# Patient Record
Sex: FEMALE | Employment: FULL TIME
[De-identification: names, ages, dates, MRNs, and addresses within clinical notes are randomized per-mention and may not be internally consistent; named-entity substitution may affect disease eponyms.]

---

## 2023-09-28 ENCOUNTER — HOSPITAL ENCOUNTER (OUTPATIENT)
Facility: HOSPITAL | Age: 53
Setting detail: RECURRING SERIES
End: 2023-09-28
Payer: COMMERCIAL

## 2023-09-28 PROCEDURE — 97162 PT EVAL MOD COMPLEX 30 MIN: CPT

## 2023-09-28 PROCEDURE — 97535 SELF CARE MNGMENT TRAINING: CPT

## 2023-09-28 NOTE — PROGRESS NOTES
[facility]  [address] [facility], [address] [facility]  Ph [phone]  Fx [phone]    Plan of Care/ Statement of Necessity for Physical Therapy Services    Patient name: Anita Flor Start of Care: 2023   Referral source: Marta Stroud DO : 1970    Medical Diagnosis: Other symptoms and signs involving the genitourinary system [R39.89]   Onset Date:3/28/2023    Treatment Diagnosis: Other symptoms and signs involving the genitourinary system [R39.89]   Prior Hospitalization: see medical history Provider#: [id number]   Medications: Verified on Patient summary List    Comorbidities:  1 pregnancy/ vaginal with tearing (3 stitches), deviated septum   Prior Level of Function: active lifestyle, no UI          The Plan of Care and following information is based on the information from the initial evaluation. Assessment/ key information: Patient is a 47year old female presenting to Physical Therapy with c/o stress urinary incontinence, urinary frequency,  and cystocele which is limiting ability function at previous level. Patient has no pain complaints with palpation of the pelvic floor muscles. Patient presents with decreased strength, coordination, and endurance of the pelvic floor muscles and decreased strength of postural stabilizers. Patient presents with limited understanding of bladder and bowel anatomy/function, dietary irritants, and urge suppression. I feel patient would benefit from skilled therapeutic intervention to optimize highest functional level possible. Evaluation Complexity HistoryMEDIUM  Complexity : 1-2 comorbidities / personal factors will impact the outcome/ POC  ; Examination MEDIUM Complexity : 3 Standardized tests and measures addressin body structure, function, activity limitation and / or participation in recreation  ;Presentation MEDIUM Complexity : Evolving with changing characteristics  ; Clinical Decision Making MEDIUM Complexity : FOTO

## 2023-09-28 NOTE — PROGRESS NOTES
Physical Therapy Evaluation      Patient Name: Salome Rodriguez    Date: 2023    : 1970  Insurance: Payor: Heidi Mancuso / Plan: Heidi Mancuso / Product Type: *No Product type* /      Patient  verified yes     Visit #   Current / Total 1 12   Time   In / Out 1110 1155   Pain   In / Out 0 0   Subjective Functional Status/Changes: eval   Changes to:  Meds, Allergies, Med Hx, Sx Hx?   If yes, update Summary List no       TREATMENT AREA =  Other symptoms and signs involving the genitourinary system [R39.89]    SUBJECTIVE  Pain Level (0-10 scale): 0  []constant []intermittent []improving []worsening []no change since onset    Any medication changes, allergies to medications, adverse drug reactions, diagnosis change, or new procedure performed?: [x] No    [] Yes (see summary sheet for update)  Subjective functional status/changes:     PLOF: active lifestyle, no UI  Limitations to PLOF: leaks with cough/laugh/sneeze and with lifting,   Mechanism of Injury: insidious - 3/28/2023  Current symptoms/Complaints: leaks with cough/laugh/sneeze and with lifting, cystocele  Previous Treatment/Compliance: none  PMHx/Surgical Hx: 1 pregnancy/ vaginal with tearing (3 stitches), deviated septum  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   Pt Goals: stop leaking  Barriers: []pain []financial []time []transportation []other  Motivation: very  Substance use: []Alcohol []Tobacco []other:   Cognition: A & O x 3    Other:    Current urinary complaint  leaks with activity (stress induced)  urinary frequency  sensation of incomplete bladder emptying intermittently  stress incontinence  dysfunctional voiding    Bladder complaint longevity:  6 months to 1 year    Bladder symptom progression:  the same    Frequency of UI: 3 times per day    Pad use:  none, does not require    Pad wetness when changed:  drop or two    Daytime urinary frequency:  Every 1 hour(s) during the day  - to prevent leakage    Nocturia:  0x/ none   coccyx none             Pelvic floor MMT    PERF (Performance/Endurance/Repetitions//Flicks): 4/5/9//0 abdominal  substitution noted    Pelvic muscle release pattern  2 - partial, delayed release      30 min []Eval - untimed                           Therapeutic Procedures: Tx Min Billable or 1:1 Min (if diff from Tx Min) Procedure, Rationale, Specifics   15  91943 Self Care/Home Management (timed):  improve patient knowledge and understanding of activity modification  to improve patient's ability to progress to PLOF and address remaining functional goals. (see flow sheet as applicable)     Details if applicable:  Bladder fitness, KPFE, urge suppression, post void dribble          Details if applicable:            Details if applicable:            Details if applicable:            Details if applicable:     15 13 [facility] Totals Reminder: bill using total billable min of TIMED therapeutic procedures (example: do not include dry needle or estim unattended, both untimed codes, in totals to left)  8-22 min = 1 unit; 23-37 min = 2 units; 38-52 min = 3 units; 53-67 min = 4 units; 68-82 min = 5 units   Total Total     [x]  Patient Education billed concurrently with other procedures   [x] Review HEP    [] Progressed/Changed HEP, detail:    [] Other detail:        Pain Level (0-10 scale) post treatment: 0    ASSESSMENT/Changes in Function: Patient is a 47year old female presenting to Physical Therapy with c/o stress urinary incontinence, urinary frequency,  and cystocele which is limiting ability function at previous level. Patient has no pain complaints with palpation of the pelvic floor muscles. Patient presents with decreased strength, coordination, and endurance of the pelvic floor muscles and decreased strength of postural stabilizers. Patient presents with limited understanding of bladder and bowel anatomy/function, dietary irritants, and urge suppression.  I feel patient would benefit from skilled therapeutic

## 2023-10-05 ENCOUNTER — HOSPITAL ENCOUNTER (OUTPATIENT)
Facility: HOSPITAL | Age: 53
Setting detail: RECURRING SERIES
Discharge: HOME | End: 2023-10-08
Payer: COMMERCIAL

## 2023-10-05 PROCEDURE — 97112 NEUROMUSCULAR REEDUCATION: CPT

## 2023-10-05 PROCEDURE — 97530 THERAPEUTIC ACTIVITIES: CPT

## 2023-10-05 PROCEDURE — 97110 THERAPEUTIC EXERCISES: CPT

## 2023-10-05 NOTE — PROGRESS NOTES
AM Chris Thomas [facility]   11/15/2023 10:30 AM Macario Carrizales, [facility]   11/22/2023 10:30 AM Chris Thomas, [facility]   11/29/2023 10:30 AM Chris Thomas, [facility]   12/6/2023 10:30 AM Chris Thomas [facility]   12/13/2023 10:30 AM Macario Carrizales [place] [facility] [place] [facility]   12/20/2023 10:30 AM Macario Carrizales, [facility]

## 2023-10-10 ENCOUNTER — HOSPITAL ENCOUNTER (OUTPATIENT)
Facility: HOSPITAL | Age: 53
Setting detail: RECURRING SERIES
Discharge: HOME | End: 2023-10-13
Payer: COMMERCIAL

## 2023-10-10 PROCEDURE — 97535 SELF CARE MNGMENT TRAINING: CPT

## 2023-10-10 PROCEDURE — 97110 THERAPEUTIC EXERCISES: CPT

## 2023-10-10 PROCEDURE — 97530 THERAPEUTIC ACTIVITIES: CPT

## 2023-10-10 PROCEDURE — 97112 NEUROMUSCULAR REEDUCATION: CPT

## 2023-10-10 NOTE — PROGRESS NOTES
PHYSICAL / OCCUPATIONAL THERAPY - DAILY TREATMENT NOTE (updated )    Patient Name: Jesenia Cervantes    Date: 10/10/2023    : 1970  Insurance: Payor: Debbie Don / Plan: Debbie Don / Product Type: *No Product type* /      Patient  verified Yes     Visit #   Current / Total 3 12   Time   In / Out 10:31 AM 11:10 AM   Pain   In / Out 0 0   Subjective Functional Status/Changes: Patient reports minimal practice of urge suppression. Changes to: Allergies, Med Hx, Sx Hx?   no       TREATMENT AREA =  Other symptoms and signs involving the genitourinary system [R39.89]    OBJECTIVE      Therapeutic Procedures: Tx Min Billable or 1:1 Min (if diff from Tx Min) Procedure, Rationale, Specifics   10  50459 Therapeutic Exercise (timed):  increase ROM, strength, coordination, balance, and proprioception to improve patient's ability to progress to PLOF and address remaining functional goals. (see flow sheet as applicable)    Details if applicable:       10  34604 Neuromuscular Re-Education (timed):  improve balance, coordination, kinesthetic sense, posture, core stability and proprioception to improve patient's ability to develop conscious control of individual muscles and awareness of position of extremities in order to progress to PLOF and address remaining functional goals. (see flow sheet as applicable)    Details if applicable:     9  41787 Therapeutic Activity (timed):  use of dynamic activities replicating functional movements to increase ROM, strength, coordination, balance, and proprioception in order to improve patient's ability to progress to PLOF and address remaining functional goals.   (see flow sheet as applicable)     Details if applicable:     10  82764 Self Care/Home Management (timed):  improve patient knowledge and understanding of positioning, posture/ergonomics, home safety, activity modification, diagnosis/prognosis, and physical therapy expectations, procedures and progression  to improve patient's

## 2023-10-17 ENCOUNTER — TELEPHONE (OUTPATIENT)
Facility: HOSPITAL | Age: 53
End: 2023-10-17

## 2023-10-18 ENCOUNTER — APPOINTMENT (OUTPATIENT)
Facility: HOSPITAL | Age: 53
End: 2023-10-18
Payer: COMMERCIAL

## 2023-10-24 ENCOUNTER — HOSPITAL ENCOUNTER (OUTPATIENT)
Facility: HOSPITAL | Age: 53
Setting detail: RECURRING SERIES
Discharge: HOME | End: 2023-10-27
Payer: COMMERCIAL

## 2023-10-24 PROCEDURE — 97535 SELF CARE MNGMENT TRAINING: CPT

## 2023-10-24 PROCEDURE — 97110 THERAPEUTIC EXERCISES: CPT

## 2023-10-24 PROCEDURE — 97530 THERAPEUTIC ACTIVITIES: CPT

## 2023-10-24 PROCEDURE — 97112 NEUROMUSCULAR REEDUCATION: CPT

## 2023-10-24 NOTE — PROGRESS NOTES
the bladder     Pt demonstrates improvement of current complaints evidenced by a 14 point  improvement in FOTO score. Eval: FOTO 54  FOTO score=established functional score where 100=no disability     Pt demonstrates independence with management tools & exercise program that are beneficial for current condition in order to feel comfortable with Pelvic floor PT D/C & not fear.   Eval: pt unaware of what activities to do reduce her symptoms and to avoid exacerbation of current condition        PLAN  Yes  Continue plan of care  []  Upgrade activities as tolerated  []  Discharge due to :  []  Other:    John Bates PT    10/24/2023    7:42 AM    Future Appointments   Date Time Provider [address]   10/24/2023 11:50 AM John Bates PT [facility]   10/31/2023 10:30 AM Gilbert Vasquez PT [facility]   11/8/2023 10:30 AM Na Donahue, PT [facility]   11/15/2023 10:30 AM Latricia Kirkland [facility]   11/22/2023 10:30 AM Na Donahue PT [facility]   11/29/2023 10:30 AM Latricia Kirkland [facility]   12/6/2023 10:30 AM Na Donahue PT [facility]   12/13/2023 10:30 AM Latricia Kirkland [facility]   12/20/2023 10:30 AM Latricia Kirkland [facility]

## 2023-10-25 ENCOUNTER — APPOINTMENT (OUTPATIENT)
Facility: HOSPITAL | Age: 53
End: 2023-10-25
Payer: COMMERCIAL

## 2023-10-31 ENCOUNTER — HOSPITAL ENCOUNTER (OUTPATIENT)
Facility: HOSPITAL | Age: 53
Setting detail: RECURRING SERIES
Discharge: HOME | End: 2023-11-03
Payer: COMMERCIAL

## 2023-10-31 PROCEDURE — 97112 NEUROMUSCULAR REEDUCATION: CPT

## 2023-10-31 PROCEDURE — 97535 SELF CARE MNGMENT TRAINING: CPT

## 2023-10-31 PROCEDURE — 97110 THERAPEUTIC EXERCISES: CPT

## 2023-10-31 NOTE — PROGRESS NOTES
[facility]  [address] [facility] [address] [address], [address] [facility]  Ph [phone]  Fx [phone]    Physical Therapy Discharge Summary     Patient name: Tone Smith Start of Care: 2023   Referral source: Daniel Shaffer DO : 1970               Medical Diagnosis: Other symptoms and signs involving the genitourinary system [R39.89]    Onset Date:3/28/2023               Treatment Diagnosis: Other symptoms and signs involving the genitourinary system [R39.89]   Prior Hospitalization: see medical history Provider#: [id number]   Medications: Verified on Patient summary List    Comorbidities:  1 pregnancy/ vaginal with tearing (3 stitches), deviated septum   Prior Level of Function: active lifestyle, no UI    Visits from Start of Care: 5    Missed Visits: 1    Reporting Period : 23 to 10/31/23    Updated Goals/Measure of Progress: To be achieved in 7 treatments:    Short term goals: To be achieved in 6 treatments[de-identified]  Pt demonstrates proper dietary/fluid habits & urge suppression strategies that promote bladder & bowel health to aid in management of urinary urgency & incontinence. Eval: Pt consuming insufficient water  (51 ounces of water ; goal is 70 ounces) too much caffeine& unaware of bladder fitness, dietary irritants & urge suppression strategies. DC 10/31: More aware of tools, strategies. Drinking more water, reducing tea intake. GOAL MET     GOAL MET Pt reports improvement in UI complaints evidenced by decrease in pad usage &/or amount of leakage by 50% to improve QOL. Eval: Pt reports having UI 3x/day with sneeze/cough/laugh or lifting  Current: 10/24/23: patient reports UI 1/x due to decreased coughing/sneezing, progressing  PN 10/31: 1 time last week when coughing, just a few drops.  GOAL MET     GOAL MET Pt will report voiding interval of 2 hours to improve QOL  Eval:   1 hour  Current:  pt reports being able to void every 2 hours but yesterday had to void every 20-30
Physical Therapy Discharge Instructions    [facility]  [address], [address] [place] [address]  Ph [phone]  Fx [phone]      Patient: Bertha Fitch  : 1970      Continue Home Exercise Program 1 times per day for 4 weeks, then decrease to 3 times per week        Follow up with MD:     [] Upon completion of therapy     [x] As needed      Recommendations:     [x]   Return to activity with home program    []   Return to activity with the following modifications:       []Post Rehab Program    []Join Independent aquatic program     []Return to/join local gym                LAMBERT Ruth 10/31/2023 10:48 AM
progressing  PN 10/31: Better, every 2-3 hours. GOAL MET     Pt will be able to maintain [facility] for 8 sec, 8 reps with no accessory substitution or breath holding. Eval: PFMC 4 sec, 5 reps with abdominal substitutions & breath holding  PN 10/31: Deferred     Long term goals: To be achieved in 12 treatments[de-identified]  GOAL MET Pt reports bladder continence 75% of the time with cough/sneeze/laugh. Eval: pt stress & urgency incontinence 3 times per day  PN 10/31: 1 time last week when coughing, just a few drops. GOAL MET     Pt demonstrates PFMC 4/5 & ability to maintain contraction for 10 sec, 10 reps. Eval: PFM 4/5, 4 sec hold, 5 reps, with abdominal substitution  PN 10/31: Deferred     GOAL MET Pt will report voiding interval of 3 hours to improve QOL  Eval:   1 hour  Current: reports every 2 hours progressing 10/10/23  PN 10/31:  Better, every 2-3 hours. GOAL MET     GOAL MET Pt will report a decrease by 50% of the sensation that she can not fully empty her bladder before bed to improve QOL  Eval:  pt reports that she must sit on the toilet for a prolonged time before bed to fully empty her bladder. She denies straining to empty the bladder  PN 10/31: No longer strains, \"it's easy\" to empty. GOAL MET     Pt demonstrates improvement of current complaints evidenced by a 14 point  improvement in FOTO score. Eval: FOTO 55  PN 10/31: 67 PROGRESSED But not met  FOTO score=established functional score where 100=no disability     GOAL MET Pt demonstrates independence with management tools & exercise program that are beneficial for current condition in order to feel comfortable with Pelvic floor PT D/C & not fear. Eval: pt unaware of what activities to do reduce her symptoms and to avoid exacerbation of current condition   PN 10/31: More aware of tools, strategies, exercises. More confident and requests dc today.  GOAL MET    PLAN    []  Upgrade activities as tolerated  [x]  Discharge due to : pt request due to concerns with